# Patient Record
Sex: MALE | Race: WHITE | NOT HISPANIC OR LATINO | Employment: PART TIME | URBAN - METROPOLITAN AREA
[De-identification: names, ages, dates, MRNs, and addresses within clinical notes are randomized per-mention and may not be internally consistent; named-entity substitution may affect disease eponyms.]

---

## 2017-01-31 ENCOUNTER — HOSPITAL ENCOUNTER (EMERGENCY)
Facility: MEDICAL CENTER | Age: 32
End: 2017-01-31
Attending: GENERAL ACUTE CARE HOSPITAL

## 2017-01-31 VITALS
DIASTOLIC BLOOD PRESSURE: 72 MMHG | HEIGHT: 68 IN | RESPIRATION RATE: 16 BRPM | SYSTOLIC BLOOD PRESSURE: 114 MMHG | TEMPERATURE: 97.6 F | HEART RATE: 80 BPM | WEIGHT: 128 LBS | BODY MASS INDEX: 19.4 KG/M2 | OXYGEN SATURATION: 96 %

## 2017-01-31 DIAGNOSIS — T20.10XA FACIAL BURN, FIRST DEGREE, INITIAL ENCOUNTER: ICD-10-CM

## 2017-01-31 DIAGNOSIS — T23.209A: ICD-10-CM

## 2017-01-31 DIAGNOSIS — T23.239A: ICD-10-CM

## 2017-01-31 LAB
ANION GAP SERPL CALC-SCNC: 11 MMOL/L (ref 0–11.9)
BASOPHILS # BLD AUTO: 0.3 % (ref 0–1.8)
BASOPHILS # BLD: 0.05 K/UL (ref 0–0.12)
BUN SERPL-MCNC: 7 MG/DL (ref 8–22)
CALCIUM SERPL-MCNC: 10.1 MG/DL (ref 8.5–10.5)
CHLORIDE SERPL-SCNC: 108 MMOL/L (ref 96–112)
CO2 SERPL-SCNC: 23 MMOL/L (ref 20–33)
CREAT SERPL-MCNC: 0.95 MG/DL (ref 0.5–1.4)
EOSINOPHIL # BLD AUTO: 0 K/UL (ref 0–0.51)
EOSINOPHIL NFR BLD: 0 % (ref 0–6.9)
ERYTHROCYTE [DISTWIDTH] IN BLOOD BY AUTOMATED COUNT: 41.8 FL (ref 35.9–50)
GFR SERPL CREATININE-BSD FRML MDRD: >60 ML/MIN/1.73 M 2
GLUCOSE SERPL-MCNC: 102 MG/DL (ref 65–99)
HCT VFR BLD AUTO: 46.1 % (ref 42–52)
HGB BLD-MCNC: 15.6 G/DL (ref 14–18)
IMM GRANULOCYTES # BLD AUTO: 0.07 K/UL (ref 0–0.11)
IMM GRANULOCYTES NFR BLD AUTO: 0.4 % (ref 0–0.9)
LYMPHOCYTES # BLD AUTO: 1.35 K/UL (ref 1–4.8)
LYMPHOCYTES NFR BLD: 7.4 % (ref 22–41)
MCH RBC QN AUTO: 30.6 PG (ref 27–33)
MCHC RBC AUTO-ENTMCNC: 33.8 G/DL (ref 33.7–35.3)
MCV RBC AUTO: 90.6 FL (ref 81.4–97.8)
MONOCYTES # BLD AUTO: 0.67 K/UL (ref 0–0.85)
MONOCYTES NFR BLD AUTO: 3.7 % (ref 0–13.4)
NEUTROPHILS # BLD AUTO: 16 K/UL (ref 1.82–7.42)
NEUTROPHILS NFR BLD: 88.2 % (ref 44–72)
NRBC # BLD AUTO: 0.04 K/UL
NRBC BLD AUTO-RTO: 0.2 /100 WBC
PLATELET # BLD AUTO: 191 K/UL (ref 164–446)
PMV BLD AUTO: 9.6 FL (ref 9–12.9)
POTASSIUM SERPL-SCNC: 3.7 MMOL/L (ref 3.6–5.5)
RBC # BLD AUTO: 5.09 M/UL (ref 4.7–6.1)
SODIUM SERPL-SCNC: 142 MMOL/L (ref 135–145)
WBC # BLD AUTO: 18.1 K/UL (ref 4.8–10.8)

## 2017-01-31 PROCEDURE — 80048 BASIC METABOLIC PNL TOTAL CA: CPT

## 2017-01-31 PROCEDURE — 96374 THER/PROPH/DIAG INJ IV PUSH: CPT

## 2017-01-31 PROCEDURE — A9270 NON-COVERED ITEM OR SERVICE: HCPCS | Performed by: GENERAL ACUTE CARE HOSPITAL

## 2017-01-31 PROCEDURE — 85025 COMPLETE CBC W/AUTO DIFF WBC: CPT

## 2017-01-31 PROCEDURE — 99284 EMERGENCY DEPT VISIT MOD MDM: CPT

## 2017-01-31 PROCEDURE — 96376 TX/PRO/DX INJ SAME DRUG ADON: CPT

## 2017-01-31 PROCEDURE — 700111 HCHG RX REV CODE 636 W/ 250 OVERRIDE (IP): Performed by: GENERAL ACUTE CARE HOSPITAL

## 2017-01-31 PROCEDURE — 96375 TX/PRO/DX INJ NEW DRUG ADDON: CPT

## 2017-01-31 PROCEDURE — 700102 HCHG RX REV CODE 250 W/ 637 OVERRIDE(OP): Performed by: GENERAL ACUTE CARE HOSPITAL

## 2017-01-31 PROCEDURE — 96361 HYDRATE IV INFUSION ADD-ON: CPT

## 2017-01-31 PROCEDURE — 700105 HCHG RX REV CODE 258: Performed by: GENERAL ACUTE CARE HOSPITAL

## 2017-01-31 RX ORDER — SODIUM CHLORIDE 9 MG/ML
1000 INJECTION, SOLUTION INTRAVENOUS ONCE
Status: COMPLETED | OUTPATIENT
Start: 2017-01-31 | End: 2017-01-31

## 2017-01-31 RX ORDER — HYDROCODONE BITARTRATE AND ACETAMINOPHEN 5; 325 MG/1; MG/1
2 TABLET ORAL ONCE
Status: COMPLETED | OUTPATIENT
Start: 2017-01-31 | End: 2017-01-31

## 2017-01-31 RX ORDER — MORPHINE SULFATE 4 MG/ML
4 INJECTION, SOLUTION INTRAMUSCULAR; INTRAVENOUS ONCE
Status: COMPLETED | OUTPATIENT
Start: 2017-01-31 | End: 2017-01-31

## 2017-01-31 RX ORDER — HYDROCODONE BITARTRATE AND ACETAMINOPHEN 5; 325 MG/1; MG/1
1-2 TABLET ORAL EVERY 4 HOURS PRN
Qty: 25 TAB | Refills: 0 | Status: SHIPPED | OUTPATIENT
Start: 2017-01-31

## 2017-01-31 RX ORDER — BACITRACIN ZINC AND POLYMYXIN B SULFATE 500; 1000 [USP'U]/G; [USP'U]/G
OINTMENT TOPICAL ONCE
Status: DISCONTINUED | OUTPATIENT
Start: 2017-01-31 | End: 2017-01-31 | Stop reason: HOSPADM

## 2017-01-31 RX ORDER — GINSENG 100 MG
CAPSULE ORAL
Qty: 28 G | Refills: 0 | Status: SHIPPED | OUTPATIENT
Start: 2017-01-31

## 2017-01-31 RX ORDER — ONDANSETRON 2 MG/ML
4 INJECTION INTRAMUSCULAR; INTRAVENOUS ONCE
Status: COMPLETED | OUTPATIENT
Start: 2017-01-31 | End: 2017-01-31

## 2017-01-31 RX ADMIN — MORPHINE SULFATE 4 MG: 4 INJECTION INTRAVENOUS at 20:34

## 2017-01-31 RX ADMIN — ONDANSETRON 4 MG: 2 INJECTION, SOLUTION INTRAMUSCULAR; INTRAVENOUS at 19:20

## 2017-01-31 RX ADMIN — SODIUM CHLORIDE 1000 ML: 9 INJECTION, SOLUTION INTRAVENOUS at 18:48

## 2017-01-31 RX ADMIN — MORPHINE SULFATE 4 MG: 4 INJECTION INTRAVENOUS at 18:48

## 2017-01-31 RX ADMIN — HYDROCODONE BITARTRATE AND ACETAMINOPHEN 2 TABLET: 5; 325 TABLET ORAL at 20:34

## 2017-01-31 RX ADMIN — MORPHINE SULFATE 4 MG: 4 INJECTION INTRAVENOUS at 19:20

## 2017-01-31 ASSESSMENT — PAIN SCALES - GENERAL: PAINLEVEL_OUTOF10: 8

## 2017-01-31 NOTE — ED AVS SNAPSHOT
1/31/2017          Jackson Chandler   [address]  [place] [address]    Dear Jackson:    [facility] wants to ensure your discharge home is safe and you or your loved ones have had all your questions answered regarding your care after you leave the hospital.    You may receive a telephone call within two days of your discharge.  This call is to make certain you understand your discharge instructions as well as ensure we provided you with the best care possible during your stay with us.     The call will only last approximately 3-5 minutes and will be done by a nurse.    Once again, we want to ensure your discharge home is safe and that you have a clear understanding of any next steps in your care.  If you have any questions or concerns, please do not hesitate to contact us, we are here for you.  Thank you for choosing [facility] for your healthcare needs.    Sincerely,    Alexis Deluca    [facility]

## 2017-01-31 NOTE — ED AVS SNAPSHOT
Tongal Access Code: [email or web address]  Expires: 3/2/2017  7:38 PM    Tongal  A secure, online tool to manage your health information     [email or web address]’s Tongal® is a secure, online tool that connects you to your personalized health information from the privacy of your home -- day or night - making it very easy for you to manage your healthcare. Once the activation process is completed, you can even access your medical information using the Tongal lizzy, which is available for free in the Apple Lizzy store or Google Play store.     Tongal provides the following levels of access (as shown below):   My Chart Features   [facility] Primary Care Doctor [facility]  Specialists [facility]  Urgent  Care Non-[facility]  Primary Care  Doctor   Email your healthcare team securely and privately 24/7 X X X X   Manage appointments: schedule your next appointment; view details of past/upcoming appointments X      Request prescription refills. X      View recent personal medical records, including lab and immunizations X X X X   View health record, including health history, allergies, medications X X X X   Read reports about your outpatient visits, procedures, consult and ER notes X X X X   See your discharge summary, which is a recap of your hospital and/or ER visit that includes your diagnosis, lab results, and care plan. X X       How to register for Tongal:  1. Go to  [email or web address].  2. Click on the Sign Up Now box, which takes you to the New Member Sign Up page. You will need to provide the following information:  a. Enter your Tongal Access Code exactly as it appears at the top of this page. (You will not need to use this code after you’ve completed the sign-up process. If you do not sign up before the expiration date, you must request a new code.)   b. Enter your date of birth.   c. Enter your home email address.   d. Click Submit, and follow the next screen’s instructions.  3. Create a Tongal ID. This will be your Tongal  login ID and cannot be changed, so think of one that is secure and easy to remember.  4. Create a [employer] password. You can change your password at any time.  5. Enter your Password Reset Question and Answer. This can be used at a later time if you forget your password.   6. Enter your e-mail address. This allows you to receive e-mail notifications when new information is available in Nano[employer]el.  7. Click Sign Up. You can now view your health information.    For assistance activating your Nano[employer]el account, call [phone]
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
## 2017-02-01 NOTE — DISCHARGE INSTRUCTIONS
Apply to burn on the face three times a day.  Apply to burns on hands two to three times a day with xeroform yellow guaze over any open blisters.  Make sure to wash off all medicine with gentle soap and water in between applications    Burn Care  Your skin is a natural barrier to infection. It is the largest organ of your body. Burns damage this natural protection. To help prevent infection, it is very important to follow your caregiver's instructions in the care of your burn.  Burns are classified as:  · First degree. There is only redness of the skin (erythema). No scarring is expected.  · Second degree. There is blistering of the skin. Scarring may occur with deeper burns.  · Third degree. All layers of the skin are injured, and scarring is expected.  HOME CARE INSTRUCTIONS   · Wash your hands well before changing your bandage.  · Change your bandage as often as directed by your caregiver.  · Remove the old bandage. If the bandage sticks, you may soak it off with cool, clean water.  · Cleanse the burn thoroughly but gently with mild soap and water.  · Pat the area dry with a clean, dry cloth.  · Apply a thin layer of antibacterial cream to the burn.  · Apply a clean bandage as instructed by your caregiver.  · Keep the bandage as clean and dry as possible.  · Elevate the affected area for the first 24 hours, then as instructed by your caregiver.  · Only take over-the-counter or prescription medicines for pain, discomfort, or fever as directed by your caregiver.  SEEK IMMEDIATE MEDICAL CARE IF:   · You develop excessive pain.  · You develop redness, tenderness, swelling, or red streaks near the burn.  · The burned area develops yellowish-white fluid (pus) or a bad smell.  · You have a fever.  MAKE SURE YOU:   · Understand these instructions.  · Will watch your condition.  · Will get help right away if you are not doing well or get worse.     This information is not intended to replace advice given to you by your  health care provider. Make sure you discuss any questions you have with your health care provider.     Document Released: 12/18/2006 Document Revised: 03/11/2013 Document Reviewed: 05/09/2012  [employer] Interactive Patient Education ©2016 [employer]    Second-Degree Burn  A second-degree burn affects the 2 outer layers of skin. The outer layer (epidermis) and the layer underneath it (dermis) are both burned. Another name for this type of burn is a partial thickness burn. A second-degree burn may be called minor or major. This depends on the size of the burn. It also depends on what parts of the skin are burned. Minor burns may be treated with first aid. Major burns are a medical emergency.  A second-degree burn is worse than a first-degree burn, but not as bad as a third-degree burn. A first-degree burn affects only the epidermis. A third-degree burn goes through all the layers of skin. A second-degree burn usually heals in 3 to 4 weeks. A minor second-degree burn usually does not leave a scar. Deeper second-degree burns may lead to scarring of the skin or contractures over joints. Contractures are scars that form over joints and may lead to reduced mobility at those joints.  CAUSES  · Heat (thermal) injury. This happens when skin comes in contact with something very hot. It could be a flame, a hot object, hot liquid, or steam. Most second-degree burns are thermal injuries.  · Radiation. Sunlight is one type of radiation that can burn the skin. Another type of radiation is used to heat food. Radiation is also used to treat some diseases, such as cancer. All types of radiation can burn the skin. Sunlight usually causes a first-degree burn. Radiation used for heating food or treating a disease can cause a second-degree burn.  · Electricity. Electrical burns can cause more damage under the skin than on the surface. They should always be treated as major burns.  · Chemicals. Many chemicals can burn the skin. The burn  should be flushed with cool water and checked by an emergency caregiver.  SYMPTOMS  Symptoms of second-degree burns include:  · Severe pain.  · Extreme tenderness.  · Deep redness.  · Blistered skin.  · Skin that has changed color. It might look blotchy, wet, or shiny.  · Swelling.  TREATMENT  Some second-degree burns may need to be treated in a hospital. These include major burns, electrical burns, and chemical burns. Many other second-degree burns can be treated with regular first aid, such as:  · Cooling the burn. Use cool, germ-free (sterile) salt water. Place the burned area of skin into a tub of water, or cover the burned area with clean, wet towels.  · Taking pain medicine.  · Removing the dead skin from broken blisters. A trained caregiver may do this. Do not pop blisters.  · Gently washing your skin with mild soap.  · Covering the burned area with a cream. Silver sulfadiazine is a cream for burns. An antibiotic cream, such as bacitracin, may also be used to fight infection. Do not use other ointments or creams unless your caregiver says it is okay.  · Protecting the burn with a sterile, non-sticky bandage.  · Bandaging fingers and toes separately. This keeps them from sticking together.  · Taking an antibiotic. This can help prevent infection.  · Getting a tetanus shot.  HOME CARE INSTRUCTIONS  Medication  · Take any medicine prescribed by your caregiver. Follow the directions carefully.  · Ask your caregiver if you can take over-the-counter medicine to relieve pain and swelling. Do not give aspirin to children.  · Make sure your caregiver knows about all other medicines you take. This includes over-the-counter medicines.  Burn care  · You will need to change the bandage on your burn. You may need to do this 2 or 3 times each day.  ¨ Gently clean the burned area.  ¨ Put ointment on it.  ¨ Cover the burn with a sterile bandage.  · For some deeper burns or burns that cover a large area, compression garments  may be prescribed. These garments can help minimize scarring and protect your mobility.  · Do not put butter or oil on your skin. Use only the cream prescribed by your caregiver.  · Do not put ice on your burn.  · Do not break blisters on your skin.  · Keep the bandaged area dry. You might need to take a sponge bath for awhile. Ask your caregiver when you can take a shower or a tub bath again.  · Do not scratch an itchy burn. Your caregiver may give you medicine to relieve very bad itching.  · Infection is a big danger after a second-degree burn. Tell your caregiver right away if you have signs of infection, such as:  ¨ Redness or changing color in the burned area.  ¨ Fluid leaking from the burn.  ¨ Swelling in the burn area.  ¨ A bad smell coming from the wound.  Follow-up  · Keep all follow-up appointments. This is important. This is how your caregiver can tell if your treatment is working.  · Protect your burn from sunlight. Use sunscreen whenever you go outside. Burned areas may be sensitive to the sun for up to 1 year. Exposure to the sun may also cause permanent darkening of scars.  SEEK MEDICAL CARE IF:  · You have any questions about medicines.  · You have any questions about your treatment.  · You wonder if it is okay to do a particular activity.  · You develop a fever of more than 100.5° F (38.1° C).  SEEK IMMEDIATE MEDICAL CARE IF:  · You think your burn might be infected. It may change color, become red, leak fluid, swell, or smell bad.  · You develop a fever of more than 102° F (38.9° C).     This information is not intended to replace advice given to you by your health care provider. Make sure you discuss any questions you have with your health care provider.     Document Released: 05/21/2012 Document Revised: 03/11/2013 Document Reviewed: 05/21/2012  [employer] Interactive Patient Education ©2016 [employer]